# Patient Record
Sex: FEMALE | Race: BLACK OR AFRICAN AMERICAN | NOT HISPANIC OR LATINO | Employment: FULL TIME | URBAN - METROPOLITAN AREA
[De-identification: names, ages, dates, MRNs, and addresses within clinical notes are randomized per-mention and may not be internally consistent; named-entity substitution may affect disease eponyms.]

---

## 2019-02-06 ENCOUNTER — HOSPITAL ENCOUNTER (EMERGENCY)
Facility: HOSPITAL | Age: 29
Discharge: HOME | End: 2019-02-06
Attending: EMERGENCY MEDICINE
Payer: COMMERCIAL

## 2019-02-06 VITALS
BODY MASS INDEX: 22.66 KG/M2 | SYSTOLIC BLOOD PRESSURE: 134 MMHG | RESPIRATION RATE: 16 BRPM | HEART RATE: 55 BPM | DIASTOLIC BLOOD PRESSURE: 76 MMHG | WEIGHT: 120 LBS | HEIGHT: 61 IN | TEMPERATURE: 99 F | OXYGEN SATURATION: 99 %

## 2019-02-06 DIAGNOSIS — R11.2 NON-INTRACTABLE VOMITING WITH NAUSEA, UNSPECIFIED VOMITING TYPE: Primary | ICD-10-CM

## 2019-02-06 DIAGNOSIS — K52.9 GASTROENTERITIS: ICD-10-CM

## 2019-02-06 LAB
ALBUMIN SERPL BCP-MCNC: 4.1 G/DL
ALP SERPL-CCNC: 62 U/L
ALT SERPL W/O P-5'-P-CCNC: 17 U/L
ANION GAP SERPL CALC-SCNC: 10 MMOL/L
AST SERPL-CCNC: 20 U/L
B-HCG UR QL: NEGATIVE
BACTERIA #/AREA URNS AUTO: ABNORMAL /HPF
BASOPHILS # BLD AUTO: 0.04 K/UL
BASOPHILS NFR BLD: 0.3 %
BILIRUB SERPL-MCNC: 0.9 MG/DL
BILIRUB UR QL STRIP: NEGATIVE
BUN SERPL-MCNC: 13 MG/DL
CALCIUM SERPL-MCNC: 9 MG/DL
CHLORIDE SERPL-SCNC: 104 MMOL/L
CLARITY UR REFRACT.AUTO: ABNORMAL
CO2 SERPL-SCNC: 25 MMOL/L
COLOR UR AUTO: YELLOW
CREAT SERPL-MCNC: 0.8 MG/DL
CTP QC/QA: YES
DIFFERENTIAL METHOD: ABNORMAL
EOSINOPHIL # BLD AUTO: 0 K/UL
EOSINOPHIL NFR BLD: 0.1 %
ERYTHROCYTE [DISTWIDTH] IN BLOOD BY AUTOMATED COUNT: 12.8 %
EST. GFR  (AFRICAN AMERICAN): >60 ML/MIN/1.73 M^2
EST. GFR  (NON AFRICAN AMERICAN): >60 ML/MIN/1.73 M^2
GLUCOSE SERPL-MCNC: 80 MG/DL
GLUCOSE UR QL STRIP: NEGATIVE
HCT VFR BLD AUTO: 43.2 %
HGB BLD-MCNC: 13.8 G/DL
HGB UR QL STRIP: ABNORMAL
IMM GRANULOCYTES # BLD AUTO: 0.05 K/UL
IMM GRANULOCYTES NFR BLD AUTO: 0.4 %
KETONES UR QL STRIP: ABNORMAL
LEUKOCYTE ESTERASE UR QL STRIP: NEGATIVE
LIPASE SERPL-CCNC: 24 U/L
LYMPHOCYTES # BLD AUTO: 2.1 K/UL
LYMPHOCYTES NFR BLD: 15.3 %
MCH RBC QN AUTO: 27.3 PG
MCHC RBC AUTO-ENTMCNC: 31.9 G/DL
MCV RBC AUTO: 85 FL
MICROSCOPIC COMMENT: ABNORMAL
MONOCYTES # BLD AUTO: 0.9 K/UL
MONOCYTES NFR BLD: 6.6 %
NEUTROPHILS # BLD AUTO: 10.5 K/UL
NEUTROPHILS NFR BLD: 77.3 %
NITRITE UR QL STRIP: NEGATIVE
NRBC BLD-RTO: 0 /100 WBC
PH UR STRIP: 6 [PH] (ref 5–8)
PLATELET # BLD AUTO: 264 K/UL
PMV BLD AUTO: 10.2 FL
POTASSIUM SERPL-SCNC: 3.6 MMOL/L
PROT SERPL-MCNC: 7.5 G/DL
PROT UR QL STRIP: NEGATIVE
RBC # BLD AUTO: 5.06 M/UL
RBC #/AREA URNS AUTO: 16 /HPF (ref 0–4)
SODIUM SERPL-SCNC: 139 MMOL/L
SP GR UR STRIP: 1.03 (ref 1–1.03)
SQUAMOUS #/AREA URNS AUTO: 1 /HPF
URN SPEC COLLECT METH UR: ABNORMAL
WBC # BLD AUTO: 13.54 K/UL
WBC #/AREA URNS AUTO: 0 /HPF (ref 0–5)

## 2019-02-06 PROCEDURE — 99284 EMERGENCY DEPT VISIT MOD MDM: CPT | Mod: 25

## 2019-02-06 PROCEDURE — 25000003 PHARM REV CODE 250: Performed by: PHYSICIAN ASSISTANT

## 2019-02-06 PROCEDURE — 81001 URINALYSIS AUTO W/SCOPE: CPT

## 2019-02-06 PROCEDURE — 83690 ASSAY OF LIPASE: CPT

## 2019-02-06 PROCEDURE — 80053 COMPREHEN METABOLIC PANEL: CPT

## 2019-02-06 PROCEDURE — 85025 COMPLETE CBC W/AUTO DIFF WBC: CPT

## 2019-02-06 PROCEDURE — 96374 THER/PROPH/DIAG INJ IV PUSH: CPT

## 2019-02-06 PROCEDURE — 63600175 PHARM REV CODE 636 W HCPCS: Performed by: PHYSICIAN ASSISTANT

## 2019-02-06 PROCEDURE — 99284 EMERGENCY DEPT VISIT MOD MDM: CPT | Mod: ,,, | Performed by: EMERGENCY MEDICINE

## 2019-02-06 PROCEDURE — 99284 PR EMERGENCY DEPT VISIT,LEVEL IV: ICD-10-PCS | Mod: ,,, | Performed by: EMERGENCY MEDICINE

## 2019-02-06 PROCEDURE — 81025 URINE PREGNANCY TEST: CPT | Performed by: PHYSICIAN ASSISTANT

## 2019-02-06 PROCEDURE — 96361 HYDRATE IV INFUSION ADD-ON: CPT

## 2019-02-06 RX ORDER — ONDANSETRON 2 MG/ML
4 INJECTION INTRAMUSCULAR; INTRAVENOUS
Status: COMPLETED | OUTPATIENT
Start: 2019-02-06 | End: 2019-02-06

## 2019-02-06 RX ORDER — ONDANSETRON 4 MG/1
4 TABLET, FILM COATED ORAL EVERY 6 HOURS PRN
Qty: 12 TABLET | Refills: 0 | Status: SHIPPED | OUTPATIENT
Start: 2019-02-06 | End: 2019-02-06 | Stop reason: SDUPTHER

## 2019-02-06 RX ORDER — ONDANSETRON 4 MG/1
4 TABLET, FILM COATED ORAL EVERY 6 HOURS PRN
Qty: 12 TABLET | Refills: 0 | Status: SHIPPED | OUTPATIENT
Start: 2019-02-06 | End: 2019-02-12

## 2019-02-06 RX ADMIN — ONDANSETRON 4 MG: 2 INJECTION INTRAMUSCULAR; INTRAVENOUS at 10:02

## 2019-02-06 RX ADMIN — SODIUM CHLORIDE 1000 ML: 0.9 INJECTION, SOLUTION INTRAVENOUS at 10:02

## 2019-02-06 NOTE — ED PROVIDER NOTES
Encounter Date: 2/6/2019       History     Chief Complaint   Patient presents with    Vomiting     Pt c/o vomiting since Saturday.   Pt was seen at [facility] on Monday     28-year-old female with no pertinent PMHx who presents to the ED with c/o emesis. Four nights ago she had 5 alcoholic drinks, shrimp, oysters, fish, and pork chops and shortly afterwards had one episode of emesis. She has had persistent nausea and vomiting since and has not been able to keep food or liquids down. She was evaluated by [facility] 2 days ago and was told that her WBC was elevated. She has had intermittent subjective fevers and chills. She has also had mild diarrhea since the onset of her menstrual cycle yesterday, which is typical for her. She denies any abdominal pain, chest pain, SOB, vaginal pain, urinary symptoms.       The history is provided by the patient and a parent.     Review of patient's allergies indicates:  No Known Allergies  History reviewed. No pertinent past medical history.  History reviewed. No pertinent surgical history.  History reviewed. No pertinent family history.  Social History     Tobacco Use    Smoking status: Never Smoker    Smokeless tobacco: Never Used   Substance Use Topics    Alcohol use: Yes     Comment: Occas, lasy use Saturday    Drug use: Yes     Types: Marijuana     Comment: Last use Saturday     Review of Systems   Constitutional: Positive for chills and fever (subjective). Negative for fatigue.   HENT: Negative for congestion, sinus pain and sore throat.    Eyes: Negative for visual disturbance.   Respiratory: Negative for shortness of breath.    Cardiovascular: Negative for chest pain.   Gastrointestinal: Positive for diarrhea, nausea and vomiting. Negative for abdominal pain, blood in stool and constipation.   Genitourinary: Positive for vaginal bleeding (menstrual cycle). Negative for decreased urine volume, dysuria, flank pain, frequency, hematuria and vaginal discharge.    Musculoskeletal: Negative for back pain.   Skin: Negative for rash.   Neurological: Negative for dizziness, weakness, light-headedness and headaches.   Hematological: Does not bruise/bleed easily.       Physical Exam     Initial Vitals [02/06/19 0912]   BP Pulse Resp Temp SpO2   136/88 (!) 55 16 98 °F (36.7 °C) 100 %      MAP       --         Physical Exam    Nursing note and vitals reviewed.  Constitutional: Vital signs are normal. She appears well-developed and well-nourished.   HENT:   Head: Normocephalic.   Mouth/Throat: Uvula is midline and oropharynx is clear and moist. Mucous membranes are dry.   Eyes: Conjunctivae, EOM and lids are normal.   Neck: Normal range of motion. Neck supple.   Cardiovascular: Normal rate, regular rhythm and normal heart sounds.   Pulmonary/Chest: Breath sounds normal. She has no wheezes. She has no rhonchi. She has no rales.   Abdominal: Normal appearance and bowel sounds are normal. There is tenderness. There is no rebound and no guarding.   Slight tenderness to palpation over left upper quadrant. No rebound or guarding. No CVA tenderness to palpation.    Musculoskeletal: Normal range of motion. She exhibits no edema or tenderness.   No midline C, T, or L spine tenderness to palpation.    Neurological: She is alert and oriented to person, place, and time. GCS score is 15. GCS eye subscore is 4. GCS verbal subscore is 5. GCS motor subscore is 6.   Skin: Skin is warm. Capillary refill takes less than 2 seconds.   Psychiatric: She has a normal mood and affect.         ED Course   Procedures  Labs Reviewed   CBC W/ AUTO DIFFERENTIAL - Abnormal; Notable for the following components:       Result Value    WBC 13.54 (*)     MCHC 31.9 (*)     Gran # (ANC) 10.5 (*)     Immature Grans (Abs) 0.05 (*)     Gran% 77.3 (*)     Lymph% 15.3 (*)     All other components within normal limits   URINALYSIS, REFLEX TO URINE CULTURE - Abnormal; Notable for the following components:    Appearance, UA  Hazy (*)     Ketones, UA 3+ (*)     Occult Blood UA 2+ (*)     All other components within normal limits    Narrative:     Preferred Collection Type->Urine, Clean Catch   URINALYSIS MICROSCOPIC - Abnormal; Notable for the following components:    RBC, UA 16 (*)     Bacteria, UA Few (*)     All other components within normal limits    Narrative:     Preferred Collection Type->Urine, Clean Catch   COMPREHENSIVE METABOLIC PANEL   LIPASE   LIPASE    Narrative:     Lipase add on per Sabrina Collins  11:15  02/06/2019    POCT URINE PREGNANCY          Imaging Results    None          Medical Decision Making:   Initial Assessment:   28-year-old female with no pertinent PMHx who presents to the ED with c/o emesis. She drank ETOH and ate seafood, pork and has had persistent vomiting ever since. She has not tolerated anything PO. She was seen at [facility] and was told she had leukocytosis. She also has subjective fevers, chills, and diarrhea. Vital signs are stable, afebrile. RRR. Lungs CTA bilaterally. Mild TTP to left upper quadrant, no rebound or guarding. Oral mucous membranes are dry. Neurovascularly intact throughout.   Differential Diagnosis:   DDX includes but is not limited to gastroenteritis, gastritis, pancreatitis, pregnancy, UTI, other infectious etiology, dehydration, electrolyte abnormality.   Clinical Tests:   Lab Tests: Ordered and Reviewed  ED Management:  Will obtain basic labs and UA. Given 1 L of IV fluids and 4 mg IV Zofran for nausea.     UA with 3+ ketones,2+ occult blood, negative nitrites, negative leukocytes. UPT negative. Chemistries unremarkable. Hemoglobin 13.8. WBC of 13.54. Her leukocytosis is most likely secondary to gastroenteritis. I do not suspect pancreatitis as her lipase is 24.     Upon reassessment, patient is much improved after fluids and Zofran. Reviewed results with patient and patient's mother. Pt is stable and nonseptic appearing. Will discharge with prescription for Zofran and  instructions to follow up with her PCP within the next week. Advised patient to maintain adequate hydration. Reviewed safety precautions to be taken while out as patient's mother expressed concern that her daughter was given an unsolicited substance while drinking. Strict ER return precautions given. Pt expressed understanding and is agreeable with the plan.     I have discussed the treatment and management of this patient with my supervisory physician, and we agree on the plan of care.      Mary Lopez PA-C                Attending Attestation:     Physician Attestation Statement for NP/PA:   I have conducted a face to face encounter with this patient in addition to the NP/PA, due to Medical Complexity    Other NP/PA Attestation Additions:      Medical Decision Makin-year-old woman presents with nausea and vomiting. Her abdomen is soft my evaluation.  She does have a mild leukocytosis but this is likely reactive secondary to gastroenteritis.  I see no convincing evidence of acute bacterial infection.  Her abdomen is soft on repeat assessment.  There is no tenderness on my evaluation.  She had questionable left upper quadrant tenderness but normal lipase, I doubt pancreatitis.  No epigastric or right upper quadrant tenderness, I doubt acute cholecystitis.  Patient provided with instructions for p.o. hydration at home.  Script for Zofran.  Extensive return precautions.                    Clinical Impression:   The primary encounter diagnosis was Non-intractable vomiting with nausea, unspecified vomiting type. A diagnosis of Gastroenteritis was also pertinent to this visit.      Disposition:   Disposition: Discharged  Condition: Stable                        Mary Lopez PA-C  [phone]

## 2019-02-06 NOTE — DISCHARGE INSTRUCTIONS
You most likely have a viral gastritis. Your symptoms improved with Zofran today in the emergency department. You have been given a prescription for this medication. Please take as needed for your nausea. You should also maintain a bland diet. Please schedule follow up with your primary care physician within the next week.

## 2019-02-06 NOTE — ED NOTES
Patient identifiers verified and correct for Ms Gregorio  C/C: Vomiting since last week  APPEARANCE: awake and alert in NAD.  SKIN: warm, dry and intact. No breakdown or bruising.  MUSCULOSKELETAL: Patient moving all extremities spontaneously, no obvious swelling or deformities noted. Ambulates independently.  RESPIRATORY: Denies shortness of breath.Respirations unlabored.   CARDIAC: Denies CP, 2+ distal pulses; no peripheral edema  ABDOMEN: S/ND/NT, Denies nausea  : voids spontaneously, denies difficulty  Neurologic: AAO x 4; follows commands equal strength in all extremities; denies numbness/tingling. Positive weakness, Positive lightheaded.

## 2019-02-06 NOTE — ED TRIAGE NOTES
"Patient states vomiting since Sunday, 3-4 times today. Went to ED Sunday for same. Positive chills, denies abdominal pain. Last Bm this am, "soft"   "